# Patient Record
Sex: FEMALE | Race: WHITE | NOT HISPANIC OR LATINO | Employment: OTHER | URBAN - METROPOLITAN AREA
[De-identification: names, ages, dates, MRNs, and addresses within clinical notes are randomized per-mention and may not be internally consistent; named-entity substitution may affect disease eponyms.]

---

## 2023-03-09 ENCOUNTER — OFFICE VISIT (OUTPATIENT)
Dept: VASCULAR SURGERY | Facility: CLINIC | Age: 53
End: 2023-03-09
Payer: COMMERCIAL

## 2023-03-09 DIAGNOSIS — I83.893 SYMPTOMATIC VARICOSE VEINS OF BOTH LOWER EXTREMITIES: Primary | ICD-10-CM

## 2023-03-09 PROCEDURE — 99203 OFFICE O/P NEW LOW 30 MIN: CPT | Performed by: SURGERY

## 2023-03-09 RX ORDER — FERROUS SULFATE 325(65) MG
TABLET ORAL
COMMUNITY
Start: 2023-01-30

## 2023-03-09 NOTE — PATIENT INSTRUCTIONS
Varicose Veins and Spider Veins    Varicose veins are swollen, enlarged veins most often found in the legs. They are usually blue or purple in color and may bulge, twist, and stand out under the skin. Spider veins are small veins just under your skin that can look red, blue or purple.        Normally, veins return blood from the body to the heart. The leg veins have one-way valves that prevent blood from flowing backward in the vein. When the valves are weak or damaged, blood backs up in the veins. This may cause some of the veins to swell and bulge and become varicose veins.     Symptoms  Varicose veins may or may not cause symptoms. If symptoms do occur, they can include:  Legs that feel tired, achy, heavy, or itchy  Leg swelling  Leg muscle cramps  Skin changes, such as discoloration, dryness, redness, or rash (in more severe cases, you may also have sores on the skin called venous leg ulcers)    Risk factors  There are a number of factors that increase the risk for varicose veins. These can include:   Being a woman  Being older  Sitting or standing for long periods  Being overweight  Being pregnant  Having a family history of varicose veins  Hormones, birth control pills    Treatment starts with simple self-help measures (see below). If these don't help, there are many procedures that can be done to shrink or remove varicose veins. Your healthcare provider can tell you more about these options, if needed.     Home care  Support or compression stockings will likely be prescribed. If so, be sure to wear them as directed. They may help improve blood flow.  Exercising helps strengthen your leg muscles and improve blood flow. To get the most benefit, choose exercises such as walking, swimming, or cycling. Also try to exercise for at least 30 minutes on most days.  Raising your legs above heart level will help relieve swelling and keep blood from pooling in veins. Try to elevate your legs for 15 to 20 minutes at  the end of the day, and whenever you're relaxing. To make sure your legs are raised above heart level, prop them up on cushions or large pillows.  To keep blood moving when you have to sit or stand for long periods, try these tips:  At work, take walking breaks instead of coffee breaks. Walk during your lunch hour. Or try flexing your feet up and down 10 times each hour.  When standing, raise yourself up and down on your toes, or rock back and forth on your heels.  If you are overweight, talk with your healthcare provider about setting up a weight-loss plan. Maintaining a healthy weight can help reduce the strain on your veins. It may also improve symptoms, such as swelling and aching.  If you have dryness and itching, ask your provider about special lotions that can be applied to the skin to help improve symptoms.     Follow-up care  Follow up with your healthcare provider, or as directed. If imaging tests were done, you'll be told the results and if there are any new findings that affect your care.     When to seek medical advice  Call your healthcare provider right away if any of these occur:  Sudden, severe leg swelling, pain, or redness  Symptoms worsen, or they don't improve with self-care  Bleeding from any affected veins  Ulcers form on the legs, ankles, or feet  Fever of 100.4 F (38 C) or higher, or as advised by your provider    [employer] last reviewed this educational content on 4/1/2018 2000-2021 [employer]. All rights reserved. This information is not intended as a substitute for professional medical care. Always follow your healthcare professional's instructions.    Self-Care for Spider and Varicose Veins    Your healthcare provider may suggest that you try self-care. Exercising and maintaining a healthy weight may keep problem veins from getting worse. Wearing elastic stockings and elevating your legs can help improve blood flow. Taking breaks when you sit or stand helps,  too.        Wearing compression stockings  Compression stockings gently squeeze veins so blood flows upward. If you need compression stockings, your healthcare provider can prescribe them for you. Follow your healthcare provider's advice about how and when to wear them. Compression stockings come in several different levels of pressure. Ask your healthcare provider which level of pressure would help you the most.     Raising your legs above heart level will help relieve swelling and keep blood from pooling in veins. Try to elevate your legs for 15 to 20 minutes at the end of the day, and whenever you're relaxing. To make sure your legs are raised above heart level, prop them up on cushions or large pillows.    To keep blood moving when you have to sit or stand for long periods, try these tips:  At work, take walking breaks instead of coffee breaks. Walk during your lunch hour. Or try flexing your feet up and down 10 times each hour.  When standing, raise yourself up and down on your toes, or rock back and forth on your heels.    [employer] last reviewed this educational content on 11/1/2019 2000-2021 [employer]. All rights reserved. This information is not intended as a substitute for professional medical care. Always follow your healthcare professional's instructions.    Treatment Options    Sclerotherapy  Your healthcare provider will inject the vein with a special chemical that will quickly close the vein from the inside. This is particularly useful for spider veins and smaller varicose veins.      If you have large varicose veins, surgery may be the best choice. But it will not prevent new varicose veins from forming. Surgery is most often done in a surgery center or in the office.    If surgery is recommended for you, your surgery will be tailored to your needs. Varicose veins may be tied off (ligation), destroyed, or removed. Blood will then flow through the healthy veins. One or more of the  following techniques may be used:    Ablation (laser or radiofrequency)  A tiny cut in the skin is made near the varicose vein. A small tube called a catheter is inserted into the vein. Energy or heat released from the catheter tip will make the vein walls collapse and stick together, stopping all blood flow through the vein.         Ablation (glue)  A tiny cut in the skin is made near the varicose vein. A small tube called a catheter is inserted into the vein. Droplets of glue are deposited into the vein to make vein walls collapse and stick together stopping blood flow through the vein.    Microphlebectomy or ambulatory phlebectomy  A special hook is used to gently take out a varicose vein through tiny incisions. Microphlebectomy may be done in your healthcare provider's office.      Vein stripping and ligation (rare)  In more severe cases, the surgeon may tie off and remove veins by making smaller cuts in the skin. Smaller branching veins may also be tied off or removed.    Know about the risks  Your healthcare provider will talk with you about the risks of surgery. These include:  Bleeding or swelling  A sense of numbness, burning, or tingling in areas near the procedure  Edema or swelling in the legs  Clots in the deep veins that may travel to the lungs  Infection  Scarring  Inflammation related to the glue    [employer] last reviewed this educational content on 11/1/2019 (Sclerotherapy image) 12/1/2019 (Radiofrequency ablation image, Microphlebectomy image)    1530-9063 [employer]. All rights reserved. This information is not intended as a substitute for professional medical care. Always follow your healthcare professional's instructions.

## 2023-03-09 NOTE — LETTER
3/9/2023         RE: Leigh Neal  [address] [facility] [address]        Dear Colleague,    Thank you for referring your patient, Leigh Neal, to the [facility] [place]. Please see a copy of my visit note below.    [facility] CONSULTATION    HPI:    Leigh Neal is a pleasant 52 year old female referred by Abby Ibanez CNP for evaluation of bilateral lower extremity pain and possible varicose veins.  She states that when she walks her legs feel heavy and throb, getting worse as the day progresses.  Her legs throb at night making it difficult for her to get to sleep, causing her to have to take Advil for relief of the discomfort.  She states that last night she was at a restaurant and felt that she needed to leave and get home so she could elevate her legs due to the pain.  The throbbing pain is located in the anterior and anterolateral legs.    She has tried compression hose but could not wear them for very long because they seem to cause her more pain and she felt that she swelled above the stockings into her knees.    She has a history of deep vein thrombosis, superficial thrombophlebitis or hemorrhage.  She does admit to swelling of her lower extremities toward the end of the day.    Her family history is negative for varicose veins.  Her mother had atherosclerotic occlusive disease but was a smoker and had diabetes.  Her father also had premature coronary artery disease but was a smoker as well.    PAST MEDICAL HISTORY: Problems  Problem Noted Date Diagnosed Date   Fibromuscular dysplasia of renal artery 12/06/2019     Overview:     Formatting of this note might be different from the original.  CT renal angio 2019.      Anxiety 11/01/2019     Overview:     Formatting of this note might be different from the original.  Following with psychology  Declines medications.    HTN (hypertension) 11/01/2019     Overview:     Formatting of this note might be different from  the original.  New onset 10/2019- Initial lab work up negative (EKG, TSH, CBC, BMP)  OCP stopped- no improvement  24 hour urine catecholamines/Metanephrines- normal.   Renal Artery US- showing right renal artery stenosis.  Referred to Vascular- CT angio abdomen/renal arteries, Findings suggestive of Fibromuscular Dysplasia (FMD) without high grade disease- no intervention, continue medications.     Cardiac CT scan done - Calcium Score 0.     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 Quit her job and reports less stress. Monitoring for now.   Lisinopril caused dry cough.    Chronic idiopathic urticaria 05/10/2019     Overview:     Formatting of this note might be different from the original.  Evaluated by Allergy   History of iron deficiency anemia 2016     Overview:     Formatting of this note might be different from the original.  Likely r/t menorrhagia- Considering Uterine Ablation     PAST SURGICAL HISTORY:   Surgery Date Site/Laterality Comments   NO PREVIOUS SURGERY          FAMILY HISTORY:   Autism Brother 1   High Performing   Other Brother 1   Passed- GI Bleed   Good Health Brother 2       Cancer Father    bladder, smoker   Heart Disease Father        Heart failure Father        Other Father    LV thrombus    Diabetes Maternal Aunt        Stroke Maternal Grandmother        Diabetes Mother        Heart Disease Mother        Hyperlipidemia Mother        Hypertension Mother        Other Mother    passed away from complications of colonoscopy   Stroke Mother        Hypertension Sister          Family History  Relation Name Status Comments   Brother 1        Brother 2   Alive     Father    Alive     Maternal Aunt          Maternal Grandmother          Mother         Sister    Alive        SOCIAL HISTORY:   Smoking Tobacco: Never           Smokeless Tobacco: Never             Social History  Tobacco Cessation: Counseling Given: Yes      Social History  Alcohol Use Standard Drinks/Week Comments   Yes 2.5 (1 standard drink = 0.6 oz pure alcohol) Socially       REVIEW OF SYSTEMS: Review Of Systems  Skin: negative  Eyes: negative  Ears/Nose/Throat: negative  Respiratory: No shortness of breath, dyspnea on exertion, cough, or hemoptysis  Cardiovascular: negative  Gastrointestinal: negative  Genitourinary: Negative  Musculoskeletal: Leg pain, leg swelling  Neurologic: negative  Psychiatric: negative  Hematologic/Lymphatic/Immunologic: negative  Endocrine: Hot flashes      Vital signs:  There were no vitals taken for this visit.    Current Outpatient Medications   Medication Sig Dispense Refill     FEROSUL 325 (65 Fe) MG tablet TAKE 1 TABLET (325 MG) BY MOUTH ONCE DAILY WITH A MEAL.         PHYSICAL EXAM:  General: Pleasant, NAD.   HEENT: Normocephalic, atraumatic, external ears and nose normal.   Respiratory: Normal respiratory effort.   Cardiovascular: Pulse is regular.   Musculoskeletal: Gait and station normal.  The joints of her fingers and toes without deformity.  There is no cyanosis of her nailbeds.   EXTREMITIES: Right lower extremity: No varicose veins.  There are 3, small protuberances on the mid anteromedial and anterior right leg.  These are in the vicinity of some reticular veins but appear to represent small breaks in the fascia rather than varicose veins as they are deep to the subcutaneous tissues.  No stasis changes or edema    Left lower extremity: 1 bulging area on the mid anterolateral left leg but no significant varicose veins, stasis changes or edema  PULSES: R/L (3=normal pulse, 0=no palpable pulse) dorsalis pedis: 3/3; posterior tibial: 3/3.      Neurologic: Grossly normal  Psychiatric: Mood, affect, judgment and insight are normal     ASSESSMENT:  Bilateral leg pain, worse when exercising or after sitting for long periods of time.  The areas that she pointed out on the anterior aspects of her legs appear to represent small  breaks in the fascia with tissue protruding through them rather than varicose veins.  This does not represent arterial insufficiency as she has easily palpable pulses in her feet.    We discussed the lower extremity vein anatomy using lower extremity vein drawing, the pathophysiology of venous insufficiency and the option of continued conservative measures.  She has tried using compression and does not tolerate them.  She has been using a lighter compression is much as possible.    We discussed options for treating superficial venous insufficiency utilizing office-based endovenous ablation.  Risk and benefits were discussed.  The patient voiced understanding of our discussion and her questions were answered.    PLAN:  Bilateral lower extremity venous competency studies with video visit to discuss results     Markel Banks MD    Dictated using Dragon voice recognition software which may result in transcription errors          [facility] D[facility]:                  Again, thank you for allowing me to participate in the care of your patient.        Sincerely,        Markel Banks MD

## 2023-03-09 NOTE — NURSING NOTE
Patient Reported symptoms:    Right leg   Heaviness Some of the time   Achiness Some of the time   Swelling None of the time   Throbbing Some of the time   Itching None of the time   Appearance Slightly noticeable   Impact on work/activities Symptoms but full able to participate    Left Leg   Heaviness Some of the time   Achiness Some of the time   Swelling None of the time   Throbbing Some of the time   Itching None of the time   Appearance Slightly noticeable   Impact on work/activities Symptoms but full able to participate

## 2023-03-09 NOTE — PROGRESS NOTES
[facility] CONSULTATION    HPI:    Leigh Neal is a pleasant 52 year old female referred by Abby Ibanez CNP for evaluation of bilateral lower extremity pain and possible varicose veins.  She states that when she walks her legs feel heavy and throb, getting worse as the day progresses.  Her legs throb at night making it difficult for her to get to sleep, causing her to have to take Advil for relief of the discomfort.  She states that last night she was at a restaurant and felt that she needed to leave and get home so she could elevate her legs due to the pain.  The throbbing pain is located in the anterior and anterolateral legs.    She has tried compression hose but could not wear them for very long because they seem to cause her more pain and she felt that she swelled above the stockings into her knees.    She has a history of deep vein thrombosis, superficial thrombophlebitis or hemorrhage.  She does admit to swelling of her lower extremities toward the end of the day.    Her family history is negative for varicose veins.  Her mother had atherosclerotic occlusive disease but was a smoker and had diabetes.  Her father also had premature coronary artery disease but was a smoker as well.    PAST MEDICAL HISTORY: Problems  Problem Noted Date Diagnosed Date   Fibromuscular dysplasia of renal artery 12/06/2019     Overview:     Formatting of this note might be different from the original.  CT renal angio 2019.      Anxiety 11/01/2019     Overview:     Formatting of this note might be different from the original.  Following with psychology  Declines medications.    HTN (hypertension) 11/01/2019     Overview:     Formatting of this note might be different from the original.  New onset 10/2019- Initial lab work up negative (EKG, TSH, CBC, BMP)  OCP stopped- no improvement  24 hour urine catecholamines/Metanephrines- normal.   Renal Artery US- showing right renal artery stenosis.  Referred to Vascular- CT angio  abdomen/renal arteries, Findings suggestive of Fibromuscular Dysplasia (FMD) without high grade disease- no intervention, continue medications.     Cardiac CT scan done - Calcium Score 0.     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 Quit her job and reports less stress. Monitoring for now.   Lisinopril caused dry cough.    Chronic idiopathic urticaria 05/10/2019     Overview:     Formatting of this note might be different from the original.  Evaluated by Allergy   History of iron deficiency anemia 2016     Overview:     Formatting of this note might be different from the original.  Likely r/t menorrhagia- Considering Uterine Ablation     PAST SURGICAL HISTORY:   Surgery Date Site/Laterality Comments   NO PREVIOUS SURGERY          FAMILY HISTORY:   Autism Brother 1   High Performing   Other Brother 1   Passed- GI Bleed   Good Health Brother 2       Cancer Father    bladder, smoker   Heart Disease Father        Heart failure Father        Other Father    LV thrombus    Diabetes Maternal Aunt        Stroke Maternal Grandmother        Diabetes Mother        Heart Disease Mother        Hyperlipidemia Mother        Hypertension Mother        Other Mother    passed away from complications of colonoscopy   Stroke Mother        Hypertension Sister          Family History  Relation Name Status Comments   Brother 1        Brother 2   Alive     Father    Alive     Maternal Aunt          Maternal Grandmother          Mother         Sister    Alive        SOCIAL HISTORY:   Smoking Tobacco: Never           Smokeless Tobacco: Never             Social History  Tobacco Cessation: Counseling Given: Yes     Social History  Alcohol Use Standard Drinks/Week Comments   Yes 2.5 (1 standard drink = 0.6 oz pure alcohol) Socially       REVIEW OF SYSTEMS: Review Of Systems  Skin: negative  Eyes: negative  Ears/Nose/Throat: negative  Respiratory: No shortness of  breath, dyspnea on exertion, cough, or hemoptysis  Cardiovascular: negative  Gastrointestinal: negative  Genitourinary: Negative  Musculoskeletal: Leg pain, leg swelling  Neurologic: negative  Psychiatric: negative  Hematologic/Lymphatic/Immunologic: negative  Endocrine: Hot flashes      Vital signs:  There were no vitals taken for this visit.    Current Outpatient Medications   Medication Sig Dispense Refill     FEROSUL 325 (65 Fe) MG tablet TAKE 1 TABLET (325 MG) BY MOUTH ONCE DAILY WITH A MEAL.         PHYSICAL EXAM:  General: Pleasant, NAD.   HEENT: Normocephalic, atraumatic, external ears and nose normal.   Respiratory: Normal respiratory effort.   Cardiovascular: Pulse is regular.   Musculoskeletal: Gait and station normal.  The joints of her fingers and toes without deformity.  There is no cyanosis of her nailbeds.   EXTREMITIES: Right lower extremity: No varicose veins.  There are 3, small protuberances on the mid anteromedial and anterior right leg.  These are in the vicinity of some reticular veins but appear to represent small breaks in the fascia rather than varicose veins as they are deep to the subcutaneous tissues.  No stasis changes or edema    Left lower extremity: 1 bulging area on the mid anterolateral left leg but no significant varicose veins, stasis changes or edema  PULSES: R/L (3=normal pulse, 0=no palpable pulse) dorsalis pedis: 3/3; posterior tibial: 3/3.      Neurologic: Grossly normal  Psychiatric: Mood, affect, judgment and insight are normal     ASSESSMENT:  Bilateral leg pain, worse when exercising or after sitting for long periods of time.  The areas that she pointed out on the anterior aspects of her legs appear to represent small breaks in the fascia with tissue protruding through them rather than varicose veins.  This does not represent arterial insufficiency as she has easily palpable pulses in her feet.    We discussed the lower extremity vein anatomy using lower extremity vein  drawing, the pathophysiology of venous insufficiency and the option of continued conservative measures.  She has tried using compression and does not tolerate them.  She has been using a lighter compression is much as possible.    We discussed options for treating superficial venous insufficiency utilizing office-based endovenous ablation.  Risk and benefits were discussed.  The patient voiced understanding of our discussion and her questions were answered.    PLAN:  Bilateral lower extremity venous competency studies with video visit to discuss results     Markel Banks MD    Dictated using Dragon voice recognition software which may result in transcription errors          [facility] LEG DRAWING:

## 2023-03-23 ENCOUNTER — VIRTUAL VISIT (OUTPATIENT)
Dept: VASCULAR SURGERY | Facility: CLINIC | Age: 53
End: 2023-03-23
Attending: SURGERY
Payer: COMMERCIAL

## 2023-03-23 ENCOUNTER — ANCILLARY PROCEDURE (OUTPATIENT)
Dept: ULTRASOUND IMAGING | Facility: CLINIC | Age: 53
End: 2023-03-23
Attending: SURGERY
Payer: COMMERCIAL

## 2023-03-23 DIAGNOSIS — I83.893 SYMPTOMATIC VARICOSE VEINS OF BOTH LOWER EXTREMITIES: ICD-10-CM

## 2023-03-23 DIAGNOSIS — I83.893 SYMPTOMATIC VARICOSE VEINS OF BOTH LOWER EXTREMITIES: Primary | ICD-10-CM

## 2023-03-23 PROCEDURE — 99213 OFFICE O/P EST LOW 20 MIN: CPT | Mod: VID | Performed by: SURGERY

## 2023-03-23 PROCEDURE — 93970 EXTREMITY STUDY: CPT | Performed by: SURGERY

## 2023-03-23 NOTE — LETTER
3/23/2023         RE: Leigh Neal  [address] [facility] [address]        Dear Colleague,    Thank you for referring your patient, Leigh Neal, to the [facility] [place]. Please see a copy of my visit note below.    Leigh is a 52 year old who is being evaluated via a billable video visit.      How would you like to obtain your AVS? MyChart  If the video visit is dropped, the invitation should be resent by: Text to cell phone: [phone]  Will anyone else be joining your video visit? No    Video-Visit Details    Type of service:  Video Visit    Video visit start time: 4:09 PM    Video visit end time: 4:20 PM    Originating Location (pt. Location): Home    Distant Location (provider location):  On-site    Platform used for Video Visit: Treatful     [facility] [place] Progress Note    Leigh Neal presents in follow-up of bilateral lower extremity pain and, possibly, varicose veins.  Please see my consultation of 3/9/2023 for details.  She returned on 3/23/2023 for bilateral lower extremity venous competency studies, the results of which we will discuss on today's video visit.    Physical Exam  General: Pleasant female in no acute distress.  Blood pressure 171/87, pulse 63  Extremities: Right lower extremity: No varicose veins.  There are 3, small protuberances on the mid anteromedial and anterior right leg.  These are in the vicinity of some reticular veins but appear to represent small breaks in the fascia rather than varicose veins as they are deep to the subcutaneous tissues.  No stasis changes or edema     Left lower extremity: 1 bulging area on the mid anterolateral left leg but no significant varicose veins, stasis changes or edema    Ultrasound:  Name:  Liegh Neal                                                       Patient ID: [id number]  Date: 2023                                                             :  1970  Sex: female                                                                 Examined by: DWAIN Paniagua RVT  Age:  52 year old                                                         Reading MD: BEKAH Banks MD     INDICATION:  Bilateral varicose veins with pain     EXAM TYPE  BILATERAL LOWER EXTREMITY VENOUS DUPLEX FOR VENOUS INSUFFICIENCY  TECHNICAL SUMMARY     A duplex ultrasound study using color flow was performed, to evaluate the bilateral lower extremity veins for valvular incompetence with the patient in a steep reversed trendelenberg.      RIGHT:     The deep veins demonstrate phasic flow, compress and respond to augmentations.  There is no DVT.  The common femoral vein is incompetent and free of thrombus. The remaining deep veins are competent and free of thrombus.      The GSV demonstrates phasic flow, compresses and responds to augmentations from the saphenofemoral junction to the ankle with no evidence of thrombus. The great saphenous vein measures 6.0 mm at the saphenofemoral junction, 2.8 mm at the proximal thigh and 3.2 mm at the knee. The GSV is bifid in the proximal calf with the anterior branch not seen in the mid to distal calf. The GSV courses superficial from the proximal to mid calf.  The GSV is incompetent from Proximal Thigh to Mid Thigh and in the anterior branch of the proximal calf with the greatest reflux time of 3448 milliseconds.       The AASV is competent( 3.0 mm) draining into the saphenofemoral junction.      The Giacomini vein is competent( 1.9 mm) communicating with the small saphenous vein at the knee level. The Giacomini vein is thick-walled.      The SSV demonstrates phasic flow, compresses and responds to augmentations from the popliteal space to the ankle.  No reflux or thrombus is seen. The saphenopopliteal junction is absent. The SSV is thick walled from proximal to distal calf.      Perforators: there is no evidence of incompetent  veins at any  level.      Small veins (0.6 mm) are seen at the level of the anteromid shin/ lateral mid calf that correspond to area of lump felt by patient. Unable to assess for competency to do small size and unable to follow to origin.      Incidental finding: A simple non-vascular fluid collection is seen in the medial popliteal fossa measuring 2.3 x 1.0 x 0.3 cm, probable Bakers cyst.         LEFT:     The deep veins demonstrate phasic flow, compress and respond to augmentations.  There is no reflux or DVT.       The GSV demonstrates phasic flow, compresses and responds to augmentations from the saphenofemoral junction to the ankle with no evidence of reflux or thrombus. The great saphenous vein measures 7.0 mm at the saphenofemoral junction, 3.8 mm at the proximal thigh and 4.6 mm at the knee.      The AASV is not visualized.      The Giacomini vein is competent ( 1.7 mm) communicating with the small saphenous vein at the knee level. The Giacomini vein is thick walled.      The SSV demonstrates phasic flow, compresses and responds to augmentations from the popliteal space to the ankle.  No reflux or thrombus is seen. The saphenopopliteal junction is absent.      Perforators: there is no evidence of incompetent  veins at any level.      Small veins (0.8 mm) are seen at the level of the anteromid shin that correspond to area of lump felt by patient. Unable to assess for competency to do small size and unable to follow to origin.      Incidental finding: A complex non-vascular fluid collection is seen in the medial popliteal fossa measuring 4.9 x 2.3 x 1.2 cm, probable Bakers cyst.         FINAL SUMMARY:  1.  No deep or superficial vein thrombosis in either lower extremity  2.  Right common femoral vein incompetence  3.  Right proximal to mid thigh great saphenous vein incompetence  4.  Right small saphenous vein thick-walled but competent  5.  Left small saphenous vein thick walled but competent  6.  No incompetent  perforators  7.  Bilateral popliteal fossa avascular fluid collections consistent with Baker cysts       Incompetence Criteria: Greater than 500 milliseconds reflux in the superficial and  veins and greater than 1000 milliseconds reflux in the deep veins.     JAMES Banks MD, FACS     Assessment:  Her bilateral lower extremity pain is not on the basis of venous insufficiency.  The lumps in question on her anterior right greater than left legs are not related to varicose veins.  We scanned her legs very carefully and note bilateral pretibial veins measuring less than 1 mm in diameter.    I discussed her lower extremity venous competency study using a leg vein drawing.  She has limited proximal to mid right thigh great saphenous vein incompetence, not contributing to the areas of concern on her right pretibial area.    She has right common femoral vein incompetence but this is not the cause of or contributing to her right and left pretibial complaints.    I discussed this frankly with her.  She questions me because she has been told by another physician that these represented veins.  We would be happy to give copies of her ultrasound to take to be reviewed by another physician should she choose to do so.    The bulging areas on her pretibial areas could represent small breaks in the fascia with subfascial tissue protruding through.    Plan:  Conservative measures.  She will return on an as-needed basis.    Markel Banks MD    Dictated using Dragon voice recognition software which may result in transcription errors            Again, thank you for allowing me to participate in the care of your patient.        Sincerely,        Markel Banks MD

## 2023-03-23 NOTE — PROGRESS NOTES
Leigh is a 52 year old who is being evaluated via a billable video visit.      How would you like to obtain your AVS? MyChart  If the video visit is dropped, the invitation should be resent by: Text to cell phone: [phone]  Will anyone else be joining your video visit? No    Video-Visit Details    Type of service:  Video Visit    Video visit start time: 4:09 PM    Video visit end time: 4:20 PM    Originating Location (pt. Location): Home    Distant Location (provider location):  On-site    Platform used for Video Visit: [facility] [place] Progress Note    Leigh Neal presents in follow-up of bilateral lower extremity pain and, possibly, varicose veins.  Please see my consultation of 3/9/2023 for details.  She returned on 3/23/2023 for bilateral lower extremity venous competency studies, the results of which we will discuss on today's video visit.    Physical Exam  General: Pleasant female in no acute distress.  Blood pressure 171/87, pulse 63  Extremities: Right lower extremity: No varicose veins.  There are 3, small protuberances on the mid anteromedial and anterior right leg.  These are in the vicinity of some reticular veins but appear to represent small breaks in the fascia rather than varicose veins as they are deep to the subcutaneous tissues.  No stasis changes or edema     Left lower extremity: 1 bulging area on the mid anterolateral left leg but no significant varicose veins, stasis changes or edema    Ultrasound:  Name:  Leigh Neal                                                       Patient ID: [id number]  Date: 2023                                                             : 1970  Sex: female                                                                 Examined by: DWAIN Paniagua RVT  Age:  52 year old                                                         Reading MD: BEKAH Banks MD     INDICATION:  Bilateral varicose veins  with pain     EXAM TYPE  BILATERAL LOWER EXTREMITY VENOUS DUPLEX FOR VENOUS INSUFFICIENCY  TECHNICAL SUMMARY     A duplex ultrasound study using color flow was performed, to evaluate the bilateral lower extremity veins for valvular incompetence with the patient in a steep reversed trendelenberg.      RIGHT:     The deep veins demonstrate phasic flow, compress and respond to augmentations.  There is no DVT.  The common femoral vein is incompetent and free of thrombus. The remaining deep veins are competent and free of thrombus.      The GSV demonstrates phasic flow, compresses and responds to augmentations from the saphenofemoral junction to the ankle with no evidence of thrombus. The great saphenous vein measures 6.0 mm at the saphenofemoral junction, 2.8 mm at the proximal thigh and 3.2 mm at the knee. The GSV is bifid in the proximal calf with the anterior branch not seen in the mid to distal calf. The GSV courses superficial from the proximal to mid calf.  The GSV is incompetent from Proximal Thigh to Mid Thigh and in the anterior branch of the proximal calf with the greatest reflux time of 3448 milliseconds.       The AASV is competent( 3.0 mm) draining into the saphenofemoral junction.      The Giacomini vein is competent( 1.9 mm) communicating with the small saphenous vein at the knee level. The Giacomini vein is thick-walled.      The SSV demonstrates phasic flow, compresses and responds to augmentations from the popliteal space to the ankle.  No reflux or thrombus is seen. The saphenopopliteal junction is absent. The SSV is thick walled from proximal to distal calf.      Perforators: there is no evidence of incompetent  veins at any level.      Small veins (0.6 mm) are seen at the level of the anteromid shin/ lateral mid calf that correspond to area of lump felt by patient. Unable to assess for competency to do small size and unable to follow to origin.      Incidental finding: A simple  non-vascular fluid collection is seen in the medial popliteal fossa measuring 2.3 x 1.0 x 0.3 cm, probable Bakers cyst.         LEFT:     The deep veins demonstrate phasic flow, compress and respond to augmentations.  There is no reflux or DVT.       The GSV demonstrates phasic flow, compresses and responds to augmentations from the saphenofemoral junction to the ankle with no evidence of reflux or thrombus. The great saphenous vein measures 7.0 mm at the saphenofemoral junction, 3.8 mm at the proximal thigh and 4.6 mm at the knee.      The AASV is not visualized.      The Giacomini vein is competent ( 1.7 mm) communicating with the small saphenous vein at the knee level. The Giacomini vein is thick walled.      The SSV demonstrates phasic flow, compresses and responds to augmentations from the popliteal space to the ankle.  No reflux or thrombus is seen. The saphenopopliteal junction is absent.      Perforators: there is no evidence of incompetent  veins at any level.      Small veins (0.8 mm) are seen at the level of the anteromid shin that correspond to area of lump felt by patient. Unable to assess for competency to do small size and unable to follow to origin.      Incidental finding: A complex non-vascular fluid collection is seen in the medial popliteal fossa measuring 4.9 x 2.3 x 1.2 cm, probable Bakers cyst.         FINAL SUMMARY:  1.  No deep or superficial vein thrombosis in either lower extremity  2.  Right common femoral vein incompetence  3.  Right proximal to mid thigh great saphenous vein incompetence  4.  Right small saphenous vein thick-walled but competent  5.  Left small saphenous vein thick walled but competent  6.  No incompetent perforators  7.  Bilateral popliteal fossa avascular fluid collections consistent with Baker cysts       Incompetence Criteria: Greater than 500 milliseconds reflux in the superficial and  veins and greater than 1000 milliseconds reflux in the deep  veins.     JAMES Banks MD, FACS     Assessment:  Her bilateral lower extremity pain is not on the basis of venous insufficiency.  The lumps in question on her anterior right greater than left legs are not related to varicose veins.  We scanned her legs very carefully and note bilateral pretibial veins measuring less than 1 mm in diameter.    I discussed her lower extremity venous competency study using a leg vein drawing.  She has limited proximal to mid right thigh great saphenous vein incompetence, not contributing to the areas of concern on her right pretibial area.    She has right common femoral vein incompetence but this is not the cause of or contributing to her right and left pretibial complaints.    I discussed this frankly with her.  She questions me because she has been told by another physician that these represented veins.  We would be happy to give copies of her ultrasound to take to be reviewed by another physician should she choose to do so.    The bulging areas on her pretibial areas could represent small breaks in the fascia with subfascial tissue protruding through.    Plan:  Conservative measures.  She will return on an as-needed basis.    Markel Banks MD    Dictated using Dragon voice recognition software which may result in transcription errors

## 2023-05-17 ENCOUNTER — TELEPHONE (OUTPATIENT)
Dept: VASCULAR SURGERY | Facility: CLINIC | Age: 53
End: 2023-05-17
Payer: COMMERCIAL

## 2023-05-17 DIAGNOSIS — I83.812 VARICOSE VEINS OF LEG WITH PAIN, LEFT: ICD-10-CM

## 2023-05-17 DIAGNOSIS — I83.893 SYMPTOMATIC VARICOSE VEINS OF BOTH LOWER EXTREMITIES: Primary | ICD-10-CM

## 2023-05-17 NOTE — TELEPHONE ENCOUNTER
"Pt had a bilateral venous comp ultrasound and results with Dr. Banks 3/23/23.     She is c/o a \"sharp, snapping rubber band\" feeling in her left calf. She wants to know what Dr. Banks's recommendations are or what other specialist she can go to. Please advise.   "

## 2023-05-18 NOTE — TELEPHONE ENCOUNTER
"Spoke with patient in regards to her left lateral calf discomfort.  Patient describes it as a \" rubber band and pinching feeling.\" Notices more when patient is up walking. Patient tried rubbing the area and elevating, neither of which helped.     Patient called because she was wondering if something was missed on her ultrasound report or that the doctor didn't see. Patient reports she had this discomfort prior to ultrasound but it is getting worse.     Encouraged patient to take Ibuprofen/tylenol as needed for discomfort and wearing compression hose now through the weekend to see if that would help any. Will touch base with patient early next week. Encouraged patient to call with any worsening symptoms. Patient verbalized understanding of all information. No further questions or concerns at this time.   "

## 2023-05-25 ENCOUNTER — TELEPHONE (OUTPATIENT)
Dept: VASCULAR SURGERY | Facility: CLINIC | Age: 53
End: 2023-05-25
Payer: COMMERCIAL

## 2023-05-25 DIAGNOSIS — M79.662 PAIN OF LEFT LOWER LEG: Primary | ICD-10-CM

## 2023-05-25 PROCEDURE — 99207 PR NO CHARGE LOS: CPT | Performed by: SURGERY

## 2023-05-25 NOTE — TELEPHONE ENCOUNTER
[facility] [place] telephone visit documentation    Leigh Neal called with concerns of increasing pain in the mid lateral left leg without associated erythema or significant tenderness or ankle swelling.  This hurts with worst when she is walking downstairs and is not so painful when she is not walking.  It seems that the pain occurs when she is actively using her leg.  She had no pleuritic chest pain or shortness of breath.  She denies any changes in physical activity and actually has been quite limited as she has a meniscus injury in her left leg for which she is undergoing physical therapy and for which she has been quite limited in her activities.    I reviewed her left lower extremity venous competency study and see nothing to explain this Discomfort on the basis of venous insufficiency.    Assessment  The left lateral leg pain likely is not related to venous insufficiency as her competency study was essentially normal.  The pain has gotten worse since we saw her and she is concerned that something could have been missed or that she might have a blood clot.    I offered her to come in for a left lower extremity venous ultrasound to rule out deep vein thrombosis.  She is currently visiting with her uncle and will contact us if she feels like she would like to come in and have the study.    Plan  Possible left lower extremity venous ultrasound to rule out deep vein thrombosis.    The patient is to see her primary care provider this coming week as well.    JAMES Banks MD, FACS    Dictated using Dragon voice recognition software which may result in transcription errors

## 2023-09-08 NOTE — TELEPHONE ENCOUNTER
DIAGNOSIS: injury to her left knee(Jan 2023    APPOINTMENT DATE: 09/21/2023    NOTES STATUS DETAILS   OFFICE NOTE from referring provider Care Everywhere 03/22/2023 [facility]   OFFICE NOTE from other specialist N/A    DISCHARGE SUMMARY from hospital N/A    DISCHARGE REPORT from the ER N/A    OPERATIVE REPORT N/A    MEDICATION LIST N/A    EMG (for Spine) N/A    IMPLANT RECORD/STICKER N/A    LABS     CBC/DIFF N/A    CULTURES N/A    INJECTIONS DONE IN RADIOLOGY N/A    MRI Received 03/10/2023 LFT knee   CT SCAN N/A    XRAYS (IMAGES & REPORTS) N/A    TUMOR     PATHOLOGY  Slides & report N/A      Records in RF, images in PACS

## 2023-09-18 DIAGNOSIS — S83.249A MEDIAL MENISCUS TEAR: Primary | ICD-10-CM

## 2023-09-21 ENCOUNTER — PRE VISIT (OUTPATIENT)
Dept: ORTHOPEDICS | Facility: CLINIC | Age: 53
End: 2023-09-21

## 2023-10-19 ENCOUNTER — ANCILLARY PROCEDURE (OUTPATIENT)
Dept: GENERAL RADIOLOGY | Facility: CLINIC | Age: 53
End: 2023-10-19
Attending: ORTHOPAEDIC SURGERY
Payer: COMMERCIAL

## 2023-10-19 ENCOUNTER — OFFICE VISIT (OUTPATIENT)
Dept: ORTHOPEDICS | Facility: CLINIC | Age: 53
End: 2023-10-19
Payer: COMMERCIAL

## 2023-10-19 VITALS — HEIGHT: 62 IN | BODY MASS INDEX: 26.68 KG/M2 | WEIGHT: 145 LBS

## 2023-10-19 DIAGNOSIS — M17.12 PRIMARY OSTEOARTHRITIS OF LEFT KNEE: Primary | ICD-10-CM

## 2023-10-19 DIAGNOSIS — S83.249A MEDIAL MENISCUS TEAR: ICD-10-CM

## 2023-10-19 PROCEDURE — 99204 OFFICE O/P NEW MOD 45 MIN: CPT | Mod: GC | Performed by: ORTHOPAEDIC SURGERY

## 2023-10-19 PROCEDURE — 73562 X-RAY EXAM OF KNEE 3: CPT | Mod: LT | Performed by: RADIOLOGY

## 2023-10-19 ASSESSMENT — PAIN SCALES - GENERAL: PAINLEVEL: MILD PAIN (2)

## 2023-10-19 NOTE — NURSING NOTE
Reason For Visit:   Chief Complaint   Patient presents with    Left Knee - Pain     Left knee injury 1/2023-2nd opinion. Drs giving differing opinions of injury to knee.       ?  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  Part-time.  Date of injury: 1/2023  Type of injury: shoveling snow and twisted knee.  Date of surgery: N/A  Type of surgery: N/A.  Smoker: No  Request smoking cessation information: No    Sane Score  Left knee - Affected  Left Knee- 80  Right Knee- 100    Concerns with differing opinions and is not happy to possibly have surgery.  Not interested in anymore injections.(3/2023 had aspiration and cortisone injection in left knee). PT 3/23-5/23 felt it helped.  Ashley Reynolds, ATC

## 2023-10-19 NOTE — PROGRESS NOTES
CHIEF CONCERN: Left knee, second opinion    HISTORY:   Left knee pain and swelling since injury when shoveling in January 2023.  Presents for second opinion.  She has previously seen Dr. Shirley.  He feels recommend partial knee replacement versus scope.  She has trialed joint aspiration and corticosteroid injection which made her pain worse.  She continues to physical therapy which she has found to be the most helpful thing.  Continues to have stiffness and medial/posterior pain that comes and goes.  Occasionally has an effusion but this is not present today.  Denies any locking/mechanical symptoms or instability.    PAST MEDICAL HISTORY: (Reviewed with the patient and in the EPIC medical record)  No past medical history on file.    PAST SURGICAL HISTORY: (Reviewed with the patient and in the EPIC medical record)  No past surgical history on file.    MEDICATIONS: (Reviewed with the patient and in the EPIC medical record)  Current Outpatient Medications   Medication    FEROSUL 325 (65 Fe) MG tablet     No current facility-administered medications for this visit.     Notable medications include: None    ALLERGIES: (Reviewed with the patient and in the EPIC medical record)  Allergies   Allergen Reactions    Sulfa Antibiotics Nausea     Nausea and headaches    Minocycline Rash         SOCIAL HISTORY: (Reviewed with the patient and in the medical record)  --Tobacco: none      FAMILY HISTORY: (Reviewed with the patient and in the medical record)  -- No family history of bleeding, clotting, or difficulty with anesthesia    REVIEW OF SYSTEMS: (Reviewed with the patient and on the health intake form)  -- A comprehensive 10 point review of systems was conducted and is negative except as noted in the HPI    EXAM:     General: Awake, Alert and Oriented, No acute Distress. Articulate and Interactive    Body mass index is 26.52 kg/m .    Left Lower extremity :  Skin is Warm and Well perfused, no suggestion of infection  No  effusion.  Possible small Baker's cyst  Mildly tender to palpation along medial joint line  Intact to varus, valgus stress.  Lachman, anterior drawer, posterior drawer negative  .EHL/FHL/TA/GS 5/5  Sensation intact L3-S1  2+ Dorsalis Pedis Pulse    IMAGING:    Radiographs of the left knee from 10/19/23 were independently reviewed by me and findings were discussed with the patient today. The imaging demonstrates medial compartment narrowing osteoarthritis,  no significant and medial compartment left.    MRI of the left knee from 3/10/23 were independently reviewed by me and findings were discussed with the patient today. The imaging demonstrates complex degenerative tearing of the  posterior meniscus horn and body and fragmentation of the root junction. There is moderate-advanced cartilage loss of the medial compartment.    ASSESSMENT:  L knee Arthritis    Overall, has significant degenerative changes and arthritis most significant in the medial compartment. Discussed that she should continued to strengthen and use the knee. Reassured her that continued activity will not cause ongoing damage. Operative option would be for arthroplasty but this is not necessarily needed until her symptoms significantly impact her quality of life. If conservative management is helpful, it is reasonable to continue this.    PLAN:  Continue working with physical therapy for strengthening exercises.  Activity modification to optimize function and manage ongoing symptoms  Can consider repeating corticosteroid injection as frequently as 2-3/year if helpful  No need to return to clinic can reach out to be connected with arthroplasty surgeons if she desires    Morris Smith MD

## 2023-10-19 NOTE — LETTER
10/19/2023         RE: Leigh Neal  [address] [facility] [id number]        Dear Colleague,    Thank you for referring your patient, Leigh Neal, to the [facility]. Please see a copy of my visit note below.    CHIEF CONCERN: Left knee, second opinion    HISTORY:   Left knee pain and swelling since injury when shoveling in January 2023.  Presents for second opinion.  She has previously seen Dr. Shirley.  He feels recommend partial knee replacement versus scope.  She has trialed joint aspiration and corticosteroid injection which made her pain worse.  She continues to physical therapy which she has found to be the most helpful thing.  Continues to have stiffness and medial/posterior pain that comes and goes.  Occasionally has an effusion but this is not present today.  Denies any locking/mechanical symptoms or instability.    PAST MEDICAL HISTORY: (Reviewed with the patient and in the [facility] medical record)  No past medical history on file.    PAST SURGICAL HISTORY: (Reviewed with the patient and in the [facility] medical record)  No past surgical history on file.    MEDICATIONS: (Reviewed with the patient and in the [facility] medical record)  Current Outpatient Medications   Medication     FEROSUL 325 (65 Fe) MG tablet     No current facility-administered medications for this visit.     Notable medications include: None    ALLERGIES: (Reviewed with the patient and in the [facility] medical record)  Allergies   Allergen Reactions     Sulfa Antibiotics Nausea     Nausea and headaches     Minocycline Rash         SOCIAL HISTORY: (Reviewed with the patient and in the medical record)  --Tobacco: none      FAMILY HISTORY: (Reviewed with the patient and in the medical record)  -- No family history of bleeding, clotting, or difficulty with anesthesia    REVIEW OF SYSTEMS: (Reviewed with the patient and on the health intake form)  -- A comprehensive 10 point review of systems was conducted and is  negative except as noted in the HPI    EXAM:     General: Awake, Alert and Oriented, No acute Distress. Articulate and Interactive    Body mass index is 26.52 kg/m .    Left Lower extremity :  Skin is Warm and Well perfused, no suggestion of infection  No effusion.  Possible small Baker's cyst  Mildly tender to palpation along medial joint line  Intact to varus, valgus stress.  Lachman, anterior drawer, posterior drawer negative  .EHL/FHL/TA/GS 5/5  Sensation intact L3-S1  2+ Dorsalis Pedis Pulse    IMAGING:    Radiographs of the left knee from 10/19/23 were independently reviewed by me and findings were discussed with the patient today. The imaging demonstrates medial compartment narrowing osteoarthritis,  no significant and medial compartment left.    MRI of the left knee from 3/10/23 were independently reviewed by me and findings were discussed with the patient today. The imaging demonstrates complex degenerative tearing of the  posterior meniscus horn and body and fragmentation of the root junction. There is moderate-advanced cartilage loss of the medial compartment.    ASSESSMENT:  L knee Arthritis    Overall, has significant degenerative changes and arthritis most significant in the medial compartment. Discussed that she should continued to strengthen and use the knee. Reassured her that continued activity will not cause ongoing damage. Operative option would be for arthroplasty but this is not necessarily needed until her symptoms significantly impact her quality of life. If conservative management is helpful, it is reasonable to continue this.    PLAN:  Continue working with physical therapy for strengthening exercises.  Activity modification to optimize function and manage ongoing symptoms  Can consider repeating corticosteroid injection as frequently as 2-3/year if helpful  No need to return to clinic can reach out to be connected with arthroplasty surgeons if she desires    Morris Smith,  MD        Patient seen and examined with the resident. I also personally reviewed the images and interpreted the imaging myself.     Assesment: medial compartment OA, medial meniscus root tear    Plan: too much OA to warrant root repair  Goal to maximize nonop with time, nsaids, injections as needed (2-3 x / year and no lifetime max) if and when fails, consider referral for arthroplasty    I agree with history, physical and imaging as well as the assessment and plan as detailed by Dr. Smith.       Again, thank you for allowing me to participate in the care of your patient.        Sincerely,        Jerome Dowd MD

## 2023-10-22 ENCOUNTER — HEALTH MAINTENANCE LETTER (OUTPATIENT)
Age: 53
End: 2023-10-22

## 2024-07-28 ENCOUNTER — HEALTH MAINTENANCE LETTER (OUTPATIENT)
Age: 54
End: 2024-07-28

## 2025-08-10 ENCOUNTER — HEALTH MAINTENANCE LETTER (OUTPATIENT)
Age: 55
End: 2025-08-10